# Patient Record
Sex: MALE | Race: WHITE | HISPANIC OR LATINO | Employment: FULL TIME | URBAN - METROPOLITAN AREA
[De-identification: names, ages, dates, MRNs, and addresses within clinical notes are randomized per-mention and may not be internally consistent; named-entity substitution may affect disease eponyms.]

---

## 2019-06-12 ENCOUNTER — OFFICE VISIT (OUTPATIENT)
Dept: OCCUPATIONAL MEDICINE | Facility: CLINIC | Age: 29
End: 2019-06-12

## 2019-06-12 ENCOUNTER — NON-PROVIDER VISIT (OUTPATIENT)
Dept: OCCUPATIONAL MEDICINE | Facility: CLINIC | Age: 29
End: 2019-06-12

## 2019-06-12 VITALS
DIASTOLIC BLOOD PRESSURE: 64 MMHG | HEIGHT: 66 IN | WEIGHT: 134 LBS | SYSTOLIC BLOOD PRESSURE: 104 MMHG | BODY MASS INDEX: 21.53 KG/M2

## 2019-06-12 DIAGNOSIS — Z02.1 PRE-EMPLOYMENT HEALTH SCREENING EXAMINATION: ICD-10-CM

## 2019-06-12 DIAGNOSIS — Z02.1 PRE-EMPLOYMENT DRUG SCREENING: ICD-10-CM

## 2019-06-12 LAB
AMP AMPHETAMINE: NORMAL
COC COCAINE: NORMAL
INT CON NEG: NORMAL
INT CON POS: NORMAL
MET METHAMPHETAMINES: NORMAL
OPI OPIATES: NORMAL
PCP PHENCYCLIDINE: NORMAL
POC DRUG COMMENT 753798-OCCUPATIONAL HEALTH: NEGATIVE
THC: NORMAL

## 2019-06-12 PROCEDURE — 92552 PURE TONE AUDIOMETRY AIR: CPT | Performed by: PREVENTIVE MEDICINE

## 2019-06-12 PROCEDURE — 8915 PR COMPREHENSIVE PHYSICAL: Performed by: PHYSICIAN ASSISTANT

## 2019-06-12 PROCEDURE — 80305 DRUG TEST PRSMV DIR OPT OBS: CPT | Performed by: PREVENTIVE MEDICINE

## 2019-06-12 NOTE — PROGRESS NOTES
See scanned pre-employment physical and health questionnaire. Exam normal.     Patient has chronic vision loss of right eye

## 2025-01-21 ENCOUNTER — HOSPITAL ENCOUNTER (EMERGENCY)
Facility: MEDICAL CENTER | Age: 35
End: 2025-01-21
Attending: STUDENT IN AN ORGANIZED HEALTH CARE EDUCATION/TRAINING PROGRAM

## 2025-01-21 ENCOUNTER — APPOINTMENT (OUTPATIENT)
Dept: RADIOLOGY | Facility: MEDICAL CENTER | Age: 35
End: 2025-01-21
Attending: STUDENT IN AN ORGANIZED HEALTH CARE EDUCATION/TRAINING PROGRAM

## 2025-01-21 VITALS
DIASTOLIC BLOOD PRESSURE: 75 MMHG | OXYGEN SATURATION: 93 % | HEIGHT: 67 IN | HEART RATE: 73 BPM | TEMPERATURE: 97.5 F | RESPIRATION RATE: 16 BRPM | BODY MASS INDEX: 23.53 KG/M2 | WEIGHT: 149.91 LBS | SYSTOLIC BLOOD PRESSURE: 125 MMHG

## 2025-01-21 DIAGNOSIS — S20.212A CONTUSION OF LEFT CHEST WALL, INITIAL ENCOUNTER: ICD-10-CM

## 2025-01-21 LAB — EKG IMPRESSION: NORMAL

## 2025-01-21 PROCEDURE — 99284 EMERGENCY DEPT VISIT MOD MDM: CPT

## 2025-01-21 PROCEDURE — 71101 X-RAY EXAM UNILAT RIBS/CHEST: CPT | Mod: LT

## 2025-01-21 PROCEDURE — 700101 HCHG RX REV CODE 250: Performed by: STUDENT IN AN ORGANIZED HEALTH CARE EDUCATION/TRAINING PROGRAM

## 2025-01-21 PROCEDURE — 700111 HCHG RX REV CODE 636 W/ 250 OVERRIDE (IP): Mod: JZ | Performed by: STUDENT IN AN ORGANIZED HEALTH CARE EDUCATION/TRAINING PROGRAM

## 2025-01-21 PROCEDURE — 93005 ELECTROCARDIOGRAM TRACING: CPT | Mod: TC

## 2025-01-21 PROCEDURE — 700102 HCHG RX REV CODE 250 W/ 637 OVERRIDE(OP): Performed by: STUDENT IN AN ORGANIZED HEALTH CARE EDUCATION/TRAINING PROGRAM

## 2025-01-21 PROCEDURE — 93005 ELECTROCARDIOGRAM TRACING: CPT | Mod: TC | Performed by: STUDENT IN AN ORGANIZED HEALTH CARE EDUCATION/TRAINING PROGRAM

## 2025-01-21 PROCEDURE — 96372 THER/PROPH/DIAG INJ SC/IM: CPT

## 2025-01-21 PROCEDURE — A9270 NON-COVERED ITEM OR SERVICE: HCPCS | Performed by: STUDENT IN AN ORGANIZED HEALTH CARE EDUCATION/TRAINING PROGRAM

## 2025-01-21 RX ORDER — LIDOCAINE 4 G/G
1 PATCH TOPICAL ONCE
Status: DISCONTINUED | OUTPATIENT
Start: 2025-01-21 | End: 2025-01-21 | Stop reason: HOSPADM

## 2025-01-21 RX ORDER — KETOROLAC TROMETHAMINE 15 MG/ML
15 INJECTION, SOLUTION INTRAMUSCULAR; INTRAVENOUS ONCE
Status: COMPLETED | OUTPATIENT
Start: 2025-01-21 | End: 2025-01-21

## 2025-01-21 RX ORDER — HYDROCODONE BITARTRATE AND ACETAMINOPHEN 5; 325 MG/1; MG/1
1 TABLET ORAL ONCE
Status: COMPLETED | OUTPATIENT
Start: 2025-01-21 | End: 2025-01-21

## 2025-01-21 RX ORDER — ACETAMINOPHEN 325 MG/1
650 TABLET ORAL ONCE
Status: COMPLETED | OUTPATIENT
Start: 2025-01-21 | End: 2025-01-21

## 2025-01-21 RX ADMIN — KETOROLAC TROMETHAMINE 15 MG: 15 INJECTION, SOLUTION INTRAMUSCULAR; INTRAVENOUS at 06:30

## 2025-01-21 RX ADMIN — ACETAMINOPHEN 650 MG: 325 TABLET ORAL at 06:31

## 2025-01-21 RX ADMIN — HYDROCODONE BITARTRATE AND ACETAMINOPHEN 1 TABLET: 5; 325 TABLET ORAL at 06:31

## 2025-01-21 RX ADMIN — LIDOCAINE 1 PATCH: 4 PATCH TOPICAL at 06:30

## 2025-01-21 ASSESSMENT — PAIN DESCRIPTION - PAIN TYPE
TYPE: ACUTE PAIN
TYPE: ACUTE PAIN

## 2025-01-21 NOTE — ED NOTES
Bedside report received from off going RN/tech: YOSVANY Molina, assumed care of patient.  POC discussed with patient. Call light within reach, all needs addressed at this time.       Fall risk interventions in place: Keep floor surfaces clean and dry (all applicable per [place] Fall risk assessment)   Continuous monitoring: Pulse Ox or Blood Pressure  IVF/IV medications: Not Applicable   Oxygen: Room Air  Bedside sitter: Not Applicable   Isolation: Not Applicable

## 2025-01-21 NOTE — ED TRIAGE NOTES
Emiliano Graf  34 y.o. male    Chief Complaint   Patient presents with    Chest Injury     Pt states he was using a drill at work and put his chest on the drill to hold it in place, since then has had pain right where he placed his chest on it. Denies radiating anywhere.      Pt ambulated to triage for above complaint. Pt A&Ox4, VSS.    Vitals:    01/21/25 0534   BP: 120/85   Pulse: 95   Resp: 14   Temp: 36.4 °C (97.5 °F)   SpO2: 98%       Triage process explained to patient, apologized for wait time, and returned to lobby.  Pt informed to notify staff of any change in condition.

## 2025-01-21 NOTE — DISCHARGE INSTRUCTIONS
You are seen in the emergency department for a contusion of your left chest.  Please apply ice to your chest.  Take Tylenol 1000 mg and ibuprofen 400 mg to help with the pain.  Please avoid heavy lifting until you experience improvement in the pain in your left pectoralis muscle

## 2025-01-21 NOTE — Clinical Note
Emiliano Graf was seen and treated in our emergency department on 1/21/2025.  He may return to work on 01/22/2025.  Emiliano can return to work tomorrow but please allow him to avoid heavy lifting until the pain in his left pectoralis muscle is improving.     If you have any questions or concerns, please don't hesitate to call.      Husam Last D.O.

## 2025-01-21 NOTE — ED NOTES
Pt ambulated from lobby to [place] [id number] without assistance, tolerated well. Pt placed in hospital gown and is now sitting up in bed with even and unlabored breaths, in no apparent distress at this time. Will continue to monitor pt while awaiting orders.

## 2025-01-21 NOTE — ED NOTES
Discharge teaching and paperwork provided regarding muscular chest pain and all questions/concerns answered. VSS,  assessment stable. Given information regarding home care and reasons to follow up with ED or primary MD. Patient discharged to the care of self and ambulated out of the ED.

## 2025-01-21 NOTE — ED PROVIDER NOTES
ED Provider Note    CHIEF COMPLAINT  Chief Complaint   Patient presents with    Chest Injury     Pt states he was using a drill at work and put his chest on the drill to hold it in place, since then has had pain right where he placed his chest on it. Denies radiating anywhere.        EXTERNAL RECORDS REVIEWED  Outpatient urgent care note reviewed for medical history    HPI/ROS  LIMITATION TO HISTORY   Select: : None  OUTSIDE HISTORIAN(S):  None    Emiliano Graf is a 34 y.o. male presenting to the emergency department for left chest wall pain.  Patient says that he was using his cordless drill at work, was putting pressure on the back of the drill with his chest, drilling through metal.  Since then he has had pain in the area where the drill contacted his chest.  He says that he has to lift things overhead at work and that might be exacerbating his pain.  He denies any pleuritic or positional component to his pain.  No worsening with exertion.  No prior history of DVT PE.  No hemoptysis.  No recent infectious symptoms.  No other medical problems.    PAST MEDICAL HISTORY       SURGICAL HISTORY   has a past surgical history that includes vitrectomy anterior (12/19/07).    FAMILY HISTORY  History reviewed. No pertinent family history.    SOCIAL HISTORY  Social History     Tobacco Use    Smoking status: Never    Smokeless tobacco: Never   Substance and Sexual Activity    Alcohol use: Yes     Comment: occ    Drug use: Yes     Comment: marijuana    Sexual activity: Not on file       CURRENT MEDICATIONS  Home Medications       Reviewed by Marcela Turner R.N. (Registered Nurse) on 01/21/25 at 0540  Med List Status: Not Addressed     Medication Last Dose Status   HOMATROPINE HBR 5 % OP SOLN  Active   MOXIFLOXACIN HCL PO  Active   PREDNISONE PO  Active                    ALLERGIES  No Known Allergies    PHYSICAL EXAM  VITAL SIGNS: /85   Pulse 95   Temp 36.4 °C (97.5 °F) (Temporal)   Resp 14   Ht 1.702 m (5'  "7\")   Wt 68 kg (149 lb 14.6 oz)   SpO2 98%   BMI 23.48 kg/m²    General: Well- appearing , non-toxic, no acute distress  Neuro: oriented x 3, moving all extremities.   HEENT:   - Head: Normocephalic, atraumatic  - Eyes: PERRL  - Ears/Nose: normal external nose and ears  - Mouth: moist mucosal membranes  Resp: clear to auscultation, no increased work of breathing  Chest: Reproducible chest pain to the left anterior chest just lateral to the sternum.  There is no crepitus, subcutaneous emphysema, ecchymosis.  CV: Regular rate and rhythm  Abd: Soft, non-tender, non-distended  Extremities: No peripheral edema  Some pain in his left chest when I lift his left arm possibly over his head  Psych: lucid and conversational         DIAGNOSTIC STUDIES / PROCEDURES    EKG  My independent EKG interpretation:  Results for orders placed or performed during the hospital encounter of 25   EKG   Result Value Ref Range    Report       [facility] Emergency Dept.    Test Date:  2025  Pt Name:    LOVE COLBERT              Department: ER  MRN:        [id number]                      Room:  Gender:     Male                         Technician: [id number]  :        1990                   Requested By:ER TRIAGE PROTOCOL  Order #:    [id number]                    Reading MD: Husam Last    Measurements  Intervals                                Axis  Rate:       72                           P:          13  RI:         148                          QRS:        15  QRSD:       81                           T:          44  QT:         384  QTc:        421    Interpretive Statements  Sinus rhythm  No previous ECG available for comparison  No st or t changes  Electronically Signed On 2025 06:13:52 PST by Husam Last           LABS  Results for orders placed or performed during the hospital encounter of 25   EKG    Collection Time: 25  6:13 AM   Result Value Ref Range    Report       [facility] Emergency Dept.    Test Date:  2025  Pt Name:    LOVE COLBERT              Department: ER  MRN:        [id number]                      Room:  Gender:     Male                         Technician: [id number]  :        1990                   Requested By:ER TRIAGE PROTOCOL  Order #:    [id number]                    Reading MD: Husam Last    Measurements  Intervals                                Axis  Rate:       72                           P:          13  MD:         148                          QRS:        15  QRSD:       81                           T:          44  QT:         384  QTc:        421    Interpretive Statements  Sinus rhythm  No previous ECG available for comparison  No st or t changes  Electronically Signed On 2025 06:13:52 PST by Husam Last         RADIOLOGY  I have independently interpreted the diagnostic imaging associated with this visit and am waiting the final reading from the radiologist.   My preliminary interpretation is as follows:   - Reviewed rib series shows no obvious rib fracture pneumothorax pulmonary contusion.  Radiologist interpretation:   [id number] (WITH 1-VIEW CXR) LEFT    (Results Pending)           MEDICAL DECISION MAKING      ED COURSE AND PLAN    This is a 34-year-old male presenting to the emergency department for left chest pain.  Pain is reproducible on my palpation.  I suspect that he contused his left pectoralis muscle.  Relatively low suspicion for rib fracture, sternal fracture but I will order a rib series.  I do not feel that a CT scan of his chest is necessary.  His symptoms are not consistent with ACS, PE, dissection, pericarditis.  EKG was ordered per triage protocol, no MD depression, ST elevation or depression.  Do not feel that labs are indicated.        ED Course as of 25 0730  ------------------------------------------------------------  Time:  8418  Comment: Rib series shows no evidence of rib  fracture, contusion, pneumothorax.  Patient feeling better after Tylenol, Toradol, Norco, lidocaine patch here in the emergency department.  He is appropriate for discharge home.  Diagnosed with contusion of the chest wall.  Advised on strict return precautions Tylenol ibuprofen ice pack to the left chest.  He is appropriate for discharge.  By: SG           Procedures:      ----------------------------------------------------------------------------------  DISCUSSIONS    I have discussed management of the patient with the following physicians and TRINY's:      Discussion of management with other [facility] or appropriate source(s):     Escalation of care considered, and ultimately not performed:    Barriers to care at this time, including but not limited to:     Decision tools and prescription drugs considered including, but not limited to:     FINAL IMPRESSION    1. Contusion of left chest wall, initial encounter        New Prescriptions    No medications on file       No follow-up provider specified.      DISPOSITION    Discharge home, Stable        This chart was dictated using an electronic voice recognition software. The chart has been reviewed and edited but there is still possibility for dictation errors due to limitation of software.    Husam Last,  1/21/2025

## 2025-01-21 NOTE — ED NOTES
Report given to Ros SUÁREZ RN. Upon shift change pt is resting in bed with even and unlabored breaths, in no apparent distress. ERP currently at bedside. This RN removed from care.